# Patient Record
Sex: FEMALE | Employment: FULL TIME | RURAL
[De-identification: names, ages, dates, MRNs, and addresses within clinical notes are randomized per-mention and may not be internally consistent; named-entity substitution may affect disease eponyms.]

---

## 2020-07-24 ENCOUNTER — OFFICE VISIT (OUTPATIENT)
Dept: PRIMARY CARE CLINIC | Age: 20
End: 2020-07-24

## 2020-07-24 DIAGNOSIS — Z20.828 EXPOSURE TO SARS-ASSOCIATED CORONAVIRUS: Primary | ICD-10-CM

## 2020-07-24 NOTE — PROGRESS NOTES
Pt presents to the flu clinic today requesting a covid test. Pt denies symptoms but has had a possible exposure. Pt declined to be seen by a doctor today.  KT

## 2020-07-28 LAB — SARS-COV-2, NAA: NOT DETECTED

## 2020-07-31 NOTE — PROGRESS NOTES
Not able to reach patient for results. Left several messages. Hard copy has been mailed. Patient also has access to [employer].